# Patient Record
Sex: FEMALE | Race: WHITE | NOT HISPANIC OR LATINO | URBAN - METROPOLITAN AREA
[De-identification: names, ages, dates, MRNs, and addresses within clinical notes are randomized per-mention and may not be internally consistent; named-entity substitution may affect disease eponyms.]

---

## 2017-03-19 ENCOUNTER — EMERGENCY (EMERGENCY)
Facility: HOSPITAL | Age: 32
LOS: 1 days | Discharge: HOME | End: 2017-03-19
Attending: EMERGENCY MEDICINE
Payer: MEDICAID

## 2017-03-19 VITALS
RESPIRATION RATE: 20 BRPM | DIASTOLIC BLOOD PRESSURE: 98 MMHG | HEART RATE: 106 BPM | TEMPERATURE: 98 F | SYSTOLIC BLOOD PRESSURE: 147 MMHG | OXYGEN SATURATION: 98 % | WEIGHT: 115.08 LBS

## 2017-03-19 VITALS
TEMPERATURE: 97 F | HEART RATE: 84 BPM | DIASTOLIC BLOOD PRESSURE: 78 MMHG | OXYGEN SATURATION: 99 % | SYSTOLIC BLOOD PRESSURE: 142 MMHG | RESPIRATION RATE: 18 BRPM

## 2017-03-19 DIAGNOSIS — Z88.0 ALLERGY STATUS TO PENICILLIN: ICD-10-CM

## 2017-03-19 DIAGNOSIS — Z88.8 ALLERGY STATUS TO OTHER DRUGS, MEDICAMENTS AND BIOLOGICAL SUBSTANCES STATUS: ICD-10-CM

## 2017-03-19 DIAGNOSIS — Z88.2 ALLERGY STATUS TO SULFONAMIDES: ICD-10-CM
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
## 2017-03-19 DIAGNOSIS — S61.216A LACERATION WITHOUT FOREIGN BODY OF RIGHT LITTLE FINGER WITHOUT DAMAGE TO NAIL, INITIAL ENCOUNTER: ICD-10-CM
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
## 2017-03-19 DIAGNOSIS — Z23 ENCOUNTER FOR IMMUNIZATION: ICD-10-CM

## 2017-03-19 PROCEDURE — 90471 IMMUNIZATION ADMIN: CPT

## 2017-03-19 PROCEDURE — 12001 RPR S/N/AX/GEN/TRNK 2.5CM/<: CPT

## 2017-03-19 PROCEDURE — 99283 EMERGENCY DEPT VISIT LOW MDM: CPT | Mod: 25

## 2017-03-19 PROCEDURE — 90715 TDAP VACCINE 7 YRS/> IM: CPT

## 2017-03-19 RX ORDER — TETANUS TOXOID, REDUCED DIPHTHERIA TOXOID AND ACELLULAR PERTUSSIS VACCINE, ADSORBED 5; 2.5; 8; 8; 2.5 [IU]/.5ML; [IU]/.5ML; UG/.5ML; UG/.5ML; UG/.5ML
0.5 SUSPENSION INTRAMUSCULAR ONCE
Qty: 0 | Refills: 0 | Status: COMPLETED | OUTPATIENT
Start: 2017-03-19 | End: 2017-03-19

## 2017-03-19 RX ADMIN — TETANUS TOXOID, REDUCED DIPHTHERIA TOXOID AND ACELLULAR PERTUSSIS VACCINE, ADSORBED 0.5 MILLILITER(S): 5; 2.5; 8; 8; 2.5 SUSPENSION INTRAMUSCULAR at 15:36

## 2017-03-19 NOTE — ED STATDOCS - OBJECTIVE STATEMENT
This is a 31 y/o F complaining of laceration to the right fifth digit while cutting food using a mandolin at 1321 today.

## 2017-03-19 NOTE — ED STATDOCS - NS ED MD SCRIBE ATTENDING SCRIBE SECTIONS
HISTORY OF PRESENT ILLNESS/PAST MEDICAL/SURGICAL/SOCIAL HISTORY/REVIEW OF SYSTEMS/VITAL SIGNS( Pullset)/DISPOSITION/HIV/PHYSICAL EXAM/INTAKE ASSESSMENT/SCREENINGS

## 2017-03-19 NOTE — ED STATDOCS - ATTENDING CONTRIBUTION TO CARE
I, Stevie Nair, performed the initial face to face bedside interview with this patient regarding history of present illness, review of symptoms and relevant past medical, social and family history.  I completed an independent physical examination.  I was the initial provider who evaluated this patient. I have signed out the follow up of any pending tests (i.e. labs, radiological studies) to the ACP.  I have communicated the patient’s plan of care and disposition with the ACP.  The history, relevant review of systems, past medical and surgical history, medical decision making, and physical examination was documented by the scribe in my presence and I attest to the accuracy of the documentation.

## 2017-03-19 NOTE — ED STATDOCS - PROGRESS NOTE DETAILS
31 y/o female presents with laceration to right 5ht digit.   Right 5th digit: + superficial 1.5 skin avulsion to pad of the finger, no active bleeding, NVI, tendon intact.

## 2017-08-17 ENCOUNTER — APPOINTMENT (OUTPATIENT)
Dept: ORTHOPEDIC SURGERY | Facility: CLINIC | Age: 32
End: 2017-08-17
Payer: MEDICAID

## 2017-08-17 DIAGNOSIS — M54.16 RADICULOPATHY, LUMBAR REGION: ICD-10-CM

## 2017-08-17 DIAGNOSIS — M51.36 OTHER INTERVERTEBRAL DISC DEGENERATION, LUMBAR REGION: ICD-10-CM

## 2017-08-17 PROCEDURE — 72100 X-RAY EXAM L-S SPINE 2/3 VWS: CPT

## 2017-08-17 PROCEDURE — 99214 OFFICE O/P EST MOD 30 MIN: CPT

## 2017-09-14 ENCOUNTER — TRANSCRIPTION ENCOUNTER (OUTPATIENT)
Age: 32
End: 2017-09-14

## 2017-09-18 ENCOUNTER — TRANSCRIPTION ENCOUNTER (OUTPATIENT)
Age: 32
End: 2017-09-18

## 2019-04-29 ENCOUNTER — APPOINTMENT (OUTPATIENT)
Dept: ORTHOPEDIC SURGERY | Facility: CLINIC | Age: 34
End: 2019-04-29
Payer: MEDICAID

## 2019-04-29 VITALS
HEART RATE: 101 BPM | BODY MASS INDEX: 27.48 KG/M2 | WEIGHT: 140 LBS | DIASTOLIC BLOOD PRESSURE: 75 MMHG | HEIGHT: 60 IN | SYSTOLIC BLOOD PRESSURE: 112 MMHG

## 2019-04-29 DIAGNOSIS — M19.049 PRIMARY OSTEOARTHRITIS, UNSPECIFIED HAND: ICD-10-CM

## 2019-04-29 PROCEDURE — 99214 OFFICE O/P EST MOD 30 MIN: CPT

## 2019-04-29 PROCEDURE — 73130 X-RAY EXAM OF HAND: CPT | Mod: LT

## 2019-04-29 NOTE — ASSESSMENT
[FreeTextEntry1] : the patient is a 34-year-old female with 4 months of left thumb pain consistent with mild basal joint arthritis.I recommend a conservative treatment with use of a comfortable immobilizer as needed as well as anti-inflammatory medication and a course of hand therapy. She has no evidence of RCL sprain on physical exam, she'll follow up here at

## 2019-04-29 NOTE — PHYSICAL EXAM
[Normal Finger/nose] : finger to nose coordination [Normal] : no peripheral adenopathy appreciated [de-identified] : There is tenderness over the left basal joint with a positive grind test, no shoulder deformity. There is a positive crank test. There is swelling appreciated over the affected CMC joint which is not present on the opposite side. There is no evidence of infection or lymphadenopathy bilaterally to the level of the elbows There is no evidence of MCP hyperextension bilaterally. There is a negative Finkelstein's test There is no tenderness over the A-1 pulleys bilaterally. 5/5 stregnth bilaterally. \par \par The wrists have a symmetric and full range of motion bilaterally with no pain upon forced flexion, extension, pronation and supination. There is no tenderness over the scaphoid scapholunate region ligaments and no tenderness of the TFCC bilaterally. There is no tenderness of the pisotriquetral hamate hook. The second through fifth CMC his is stable and nontender bilaterally.\par There is a negative carpal tunnel compression test or Tinel's bilaterally.   [de-identified] : jairr [de-identified] : 3 x-rays of the left hand are reviewed there is mild joint space narrowing of the basal joint, no evidence of acute fracture dislocation\par \par MRI of the left hand is reviewed there is evidence of sprain of the MCP joint RCL as well as a mild joint effusion at the basal joint

## 2019-04-29 NOTE — HISTORY OF PRESENT ILLNESS
[FreeTextEntry1] : the patient is a 34-year-old female presents for evaluation of pain at the base of the left thumb. She denies trauma or inciting event. She has a dull painat approximately the basal joint with circumferential radiation. Her pain is worse with activity and improved with splinting and with rest. She denies triggering of the thumb or paresthesias.

## 2022-02-14 NOTE — ED STATDOCS - DATA REVIEWED, MDM
Rx Refill Note  Requested Prescriptions     Pending Prescriptions Disp Refills   • escitalopram (LEXAPRO) 10 MG tablet [Pharmacy Med Name: ESCITALOPRAM 10 MG TABLET] 30 tablet 5     Sig: TAKE 1 TABLET BY MOUTH EVERY DAY   • baclofen (LIORESAL) 10 MG tablet [Pharmacy Med Name: BACLOFEN 10 MG TABLET] 90 tablet 2     Sig: TAKE 1 TABLET BY MOUTH THREE TIMES A DAY   • meloxicam (MOBIC) 15 MG tablet [Pharmacy Med Name: MELOXICAM 15 MG TABLET] 30 tablet 2     Sig: TAKE 1 TABLET BY MOUTH EVERY DAY      Last office visit with prescribing clinician: 10/22/2021      Next office visit with prescribing clinician: 2/13/2022            Jessica Enriquez MA  02/14/22, 08:58 EST  
nurses notes/vital signs

## 2022-11-23 ENCOUNTER — APPOINTMENT (OUTPATIENT)
Dept: OBGYN | Facility: CLINIC | Age: 37
End: 2022-11-23

## 2022-11-23 VITALS
WEIGHT: 140 LBS | HEIGHT: 60 IN | SYSTOLIC BLOOD PRESSURE: 118 MMHG | RESPIRATION RATE: 16 BRPM | OXYGEN SATURATION: 98 % | BODY MASS INDEX: 27.48 KG/M2 | DIASTOLIC BLOOD PRESSURE: 72 MMHG | HEART RATE: 103 BPM

## 2022-11-23 DIAGNOSIS — Z86.39 PERSONAL HISTORY OF OTHER ENDOCRINE, NUTRITIONAL AND METABOLIC DISEASE: ICD-10-CM

## 2022-11-23 DIAGNOSIS — Z12.4 ENCOUNTER FOR SCREENING FOR MALIGNANT NEOPLASM OF CERVIX: ICD-10-CM

## 2022-11-23 DIAGNOSIS — Z12.39 ENCOUNTER FOR OTHER SCREENING FOR MALIGNANT NEOPLASM OF BREAST: ICD-10-CM

## 2022-11-23 DIAGNOSIS — Z87.39 PERSONAL HISTORY OF OTHER DISEASES OF THE MUSCULOSKELETAL SYSTEM AND CONNECTIVE TISSUE: ICD-10-CM

## 2022-11-23 DIAGNOSIS — Z11.3 ENCOUNTER FOR SCREENING FOR INFECTIONS WITH A PREDOMINANTLY SEXUAL MODE OF TRANSMISSION: ICD-10-CM

## 2022-11-23 DIAGNOSIS — Z80.1 FAMILY HISTORY OF MALIGNANT NEOPLASM OF TRACHEA, BRONCHUS AND LUNG: ICD-10-CM

## 2022-11-23 DIAGNOSIS — E03.9 HYPOTHYROIDISM, UNSPECIFIED: ICD-10-CM

## 2022-11-23 DIAGNOSIS — Z01.419 ENCOUNTER FOR GYNECOLOGICAL EXAMINATION (GENERAL) (ROUTINE) W/OUT ABNORMAL FINDINGS: ICD-10-CM

## 2022-11-23 PROCEDURE — 81025 URINE PREGNANCY TEST: CPT

## 2022-11-23 PROCEDURE — 99385 PREV VISIT NEW AGE 18-39: CPT

## 2022-11-23 PROCEDURE — 81003 URINALYSIS AUTO W/O SCOPE: CPT | Mod: QW

## 2022-11-23 RX ORDER — LEVOTHYROXINE SODIUM 0.09 MG/1
88 TABLET ORAL DAILY
Qty: 30 | Refills: 1 | Status: ACTIVE | COMMUNITY
Start: 2022-11-23 | End: 1900-01-01

## 2022-11-23 NOTE — COUNSELING
[Nutrition/ Exercise/ Weight Management] : nutrition, exercise, weight management [Body Image] : body image [Vitamins/Supplements] : vitamins/supplements [Sunscreen] : sunscreen [Breast Self Exam] : breast self exam [Bladder Hygiene] : bladder hygiene [STD (testing, results, tx)] : STD (testing, results, tx) [FreeTextEntry2] : Spouse had a vasectomy.

## 2022-11-23 NOTE — PHYSICAL EXAM
[Appropriately responsive] : appropriately responsive [Alert] : alert [No Acute Distress] : no acute distress [No Lymphadenopathy] : no lymphadenopathy [Regular Rate Rhythm] : regular rate rhythm [No Murmurs] : no murmurs [Clear to Auscultation B/L] : clear to auscultation bilaterally [Soft] : soft [Non-tender] : non-tender [Non-distended] : non-distended [No HSM] : No HSM [No Lesions] : no lesions [No Mass] : no mass [Oriented x3] : oriented x3 [Examination Of The Breasts] : a normal appearance [No Masses] : no breast masses were palpable [Labia Majora] : normal [Labia Minora] : normal [Normal] : normal [Uterine Adnexae] : normal [FreeTextEntry8] : Anteverted anteflexed uterus.  No adnexal masses.

## 2022-11-23 NOTE — HISTORY OF PRESENT ILLNESS
[N] : Patient reports normal menses [Y] : Positive pregnancy history [Currently Active] : currently active [Men] : men [TextBox_4] : The patient presents today for a routine GYN exam.  She offers no complaints.  We reviewed together in detail her past medical and surgical histories, allergies and medication usage, social and family history.   All questions were answered in easy to understand language.\bryan Desires STI screening\bryan Needs a refill on Synthroid.  Awaiting to see a PMD and her rx ran out. [Mammogramdate] : 01/19 [BreastSonogramDate] : 01/19 [PapSmeardate] : 01/20 [LMPDate] : 11/14/22 [MensesFreq] : 27 [MensesLength] : 4 [PGHxTotal] : 4 [[facility]] : 1 [TextBox_6] : 11/14/22 [TextBox_9] : 13 [TextBox_13] : 27 [TextBox_15] : 4 [FreeTextEntry1] : 11/14/22

## 2022-11-23 NOTE — PLAN
[FreeTextEntry1] : I have spent 40 minutes of time on this encounter.  Greater than 50% of the face-to-face encounter time was spent on counseling and/or coordination of care for examination findings, differential, testing, management and planning.\par Yearly breast cancer screening with no current clinical or radiographic concerns.  The patient was reminded regarding future well breast and general healthcare, breast cancer risk reduction, the importance of self-examination and the need for follow up.   She was again reminded of the need to take Vit D3 2500  IU daily or to keep a Vit D level above 30, and Tumeric 1000mg daily with Black Pepper.  Plan continued yearly imaging and breast follow up, sooner as needed.  I counseled the patient on current recommendations to reduce breast cancer risk including but not inclusive to regular exercise 20-30 minutes 3-4 times a week, low fat diet, limiting alcohol consumption, maintenance of ideal body weight, yearly imaging and self breast awareness.  Questions answered.  I encouraged in light of Covid 19, social distancing, frequent hand washing and precautions to stay healthy.\par \par STI screening\par \par 1 month refill of Synthroid until she can obtain a visit with her PMD patient is aware.

## 2022-11-24 LAB
C TRACH RRNA SPEC QL NAA+PROBE: NOT DETECTED
N GONORRHOEA RRNA SPEC QL NAA+PROBE: NOT DETECTED
SOURCE TP AMPLIFICATION: NORMAL

## 2022-11-30 LAB
HPV 16 E6+E7 MRNA CVX QL NAA+PROBE: NOT DETECTED
HPV18+45 E6+E7 MRNA CVX QL NAA+PROBE: NOT DETECTED

## 2022-12-05 LAB — CYTOLOGY CVX/VAG DOC THIN PREP: NORMAL

## 2022-12-19 ENCOUNTER — APPOINTMENT (OUTPATIENT)
Dept: ENDOCRINOLOGY | Facility: CLINIC | Age: 37
End: 2022-12-19

## 2022-12-19 VITALS — SYSTOLIC BLOOD PRESSURE: 132 MMHG | OXYGEN SATURATION: 98 % | DIASTOLIC BLOOD PRESSURE: 82 MMHG | HEART RATE: 109 BPM

## 2022-12-19 VITALS — HEIGHT: 60 IN | WEIGHT: 154 LBS | BODY MASS INDEX: 30.23 KG/M2

## 2022-12-19 DIAGNOSIS — Z83.3 FAMILY HISTORY OF DIABETES MELLITUS: ICD-10-CM

## 2022-12-19 DIAGNOSIS — Z82.49 FAMILY HISTORY OF ISCHEMIC HEART DISEASE AND OTHER DISEASES OF THE CIRCULATORY SYSTEM: ICD-10-CM

## 2022-12-19 PROCEDURE — 99203 OFFICE O/P NEW LOW 30 MIN: CPT

## 2022-12-19 RX ORDER — LEVOTHYROXINE SODIUM 0.09 MG/1
88 TABLET ORAL
Qty: 60 | Refills: 3 | Status: ACTIVE | COMMUNITY
Start: 2022-12-19 | End: 1900-01-01

## 2022-12-19 NOTE — PHYSICAL EXAM
[Alert] : alert [No Acute Distress] : no acute distress [Well Developed] : well developed [Normal Sclera/Conjunctiva] : normal sclera/conjunctiva [EOMI] : extra ocular movement intact [PERRL] : pupils equal, round and reactive to light [No Proptosis] : no proptosis [No Lid Lag] : no lid lag [Thyroid Not Enlarged] : the thyroid was not enlarged [No Respiratory Distress] : no respiratory distress [Clear to Auscultation] : lungs were clear to auscultation bilaterally [Normal Rate] : heart rate was normal [Regular Rhythm] : with a regular rhythm [No Edema] : no peripheral edema [Normal Bowel Sounds] : normal bowel sounds [Soft] : abdomen soft [Normal Supraclavicular Nodes] : no supraclavicular lymphadenopathy [Normal Anterior Cervical Nodes] : no anterior cervical lymphadenopathy [Normal Posterior Cervical Nodes] : no posterior cervical lymphadenopathy [No Clubbing, Cyanosis] : no clubbing  or cyanosis of the fingernails [No Joint Swelling] : no joint swelling seen [Normal Reflexes] : deep tendon reflexes were 2+ and symmetric [Oriented x3] : oriented to person, place, and time [Normal Affect] : the affect was normal [Normal Insight/Judgement] : insight and judgment were intact [Normal Mood] : the mood was normal

## 2022-12-19 NOTE — ASSESSMENT
[FreeTextEntry1] : 37 y.o. Female with PMHx of Hashimoto's thyroiditis presents to establish care with new endocrinologist. She was Dx in her 9th grade (1998-99). Did not do well non LT4 and was switched to Synthroid which is currently on.  \par \par # Long standing Hx of Hashimoto's hypothyroidism\par On stable dose of Synthroid 88 mcg daily\par No blood work available. Will obtain base line. Patient will be called for adjustments. \par \par F/u in 3 months with repeat blood work

## 2022-12-19 NOTE — HISTORY OF PRESENT ILLNESS
[FreeTextEntry1] : 37 y.o. Female with PMHx of Hashimoto's thyroiditis presents to establish care with new endocrinologist. She was Dx in her 9th grade (1998-99). Did not do well non LT4 and was switched to Synthroid which is currently on.

## 2023-01-13 RX ORDER — LEVOTHYROXINE SODIUM 88 UG/1
88 TABLET ORAL
Qty: 30 | Refills: 3 | Status: ACTIVE | COMMUNITY
Start: 2021-10-29 | End: 1900-01-01

## 2023-04-03 ENCOUNTER — APPOINTMENT (OUTPATIENT)
Dept: ENDOCRINOLOGY | Facility: CLINIC | Age: 38
End: 2023-04-03